# Patient Record
Sex: FEMALE | Race: WHITE | Employment: FULL TIME | URBAN - METROPOLITAN AREA
[De-identification: names, ages, dates, MRNs, and addresses within clinical notes are randomized per-mention and may not be internally consistent; named-entity substitution may affect disease eponyms.]

---

## 2021-09-13 ENCOUNTER — LAB REQUISITION (OUTPATIENT)
Dept: LAB | Facility: CLINIC | Age: 36
End: 2021-09-13
Payer: COMMERCIAL

## 2021-09-13 DIAGNOSIS — Z01.818 ENCOUNTER FOR OTHER PREPROCEDURAL EXAMINATION: ICD-10-CM

## 2021-09-13 DIAGNOSIS — Z13.220 ENCOUNTER FOR SCREENING FOR LIPOID DISORDERS: ICD-10-CM

## 2021-09-13 LAB
CHOLEST SERPL-MCNC: 161 MG/DL
FASTING STATUS PATIENT QL REPORTED: NORMAL
HDLC SERPL-MCNC: 70 MG/DL
LDLC SERPL CALC-MCNC: 75 MG/DL
TRIGL SERPL-MCNC: 80 MG/DL

## 2021-09-13 PROCEDURE — 80061 LIPID PANEL: CPT | Mod: ORL | Performed by: PHYSICIAN ASSISTANT

## 2021-09-13 PROCEDURE — U0003 INFECTIOUS AGENT DETECTION BY NUCLEIC ACID (DNA OR RNA); SEVERE ACUTE RESPIRATORY SYNDROME CORONAVIRUS 2 (SARS-COV-2) (CORONAVIRUS DISEASE [COVID-19]), AMPLIFIED PROBE TECHNIQUE, MAKING USE OF HIGH THROUGHPUT TECHNOLOGIES AS DESCRIBED BY CMS-2020-01-R: HCPCS | Mod: ORL | Performed by: PHYSICIAN ASSISTANT

## 2021-09-14 LAB — SARS-COV-2 RNA RESP QL NAA+PROBE: NEGATIVE

## 2023-10-30 ENCOUNTER — OFFICE VISIT (OUTPATIENT)
Dept: URGENT CARE | Facility: URGENT CARE | Age: 38
End: 2023-10-30
Payer: COMMERCIAL

## 2023-10-30 VITALS
HEART RATE: 71 BPM | TEMPERATURE: 97.8 F | RESPIRATION RATE: 15 BRPM | OXYGEN SATURATION: 98 % | DIASTOLIC BLOOD PRESSURE: 81 MMHG | SYSTOLIC BLOOD PRESSURE: 128 MMHG

## 2023-10-30 DIAGNOSIS — S61.552A DOG BITE OF LEFT WRIST, INITIAL ENCOUNTER: Primary | ICD-10-CM
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
## 2023-10-30 PROCEDURE — 99203 OFFICE O/P NEW LOW 30 MIN: CPT | Performed by: PHYSICIAN ASSISTANT

## 2023-10-30 RX ORDER — ESCITALOPRAM OXALATE 10 MG/1
10 TABLET ORAL DAILY
COMMUNITY

## 2023-10-30 NOTE — PATIENT INSTRUCTIONS
Patient was educated on the natural course of injury.  Keep wound dry and clean. Wash area with soap and water. Watch for signs of infection such as redness or purulent drainage. Conservative measures discussed including over-the-counter Tylenol as needed for pain. See your primary care provider in 7 days if there is no improvement or sooner as needed. Seek emergency care if you develop fever, streaking, severe pain or rapidly spreading redness.

## 2023-10-30 NOTE — PROGRESS NOTES
URGENT CARE VISIT:    SUBJECTIVE:   Asha Wise is a 38 year old female who presents with a chief complaint of an animal bite on the left wrist.  She was bitten by her dog today. Cicumstances of bite: animals fighting.  Severity: bite with skin break.  Animal's immunizations up to date  Associated symptoms include none. Tetanus status: last tetanus booster within 10 years    PMH: No past medical history on file.  Allergies: Patient has no known allergies.   Medications:   Current Outpatient Medications   Medication Sig Dispense Refill    amoxicillin-clavulanate (AUGMENTIN) 875-125 MG tablet Take 1 tablet by mouth 2 times daily for 7 days 14 tablet 0    escitalopram (LEXAPRO) 10 MG tablet Take 10 mg by mouth daily      levonorgestrel (MIRENA) 52 MG (20 mcg/day) IUD by Intrauterine route once       Social History:   Social History     Tobacco Use    Smoking status: Not on file    Smokeless tobacco: Not on file   Substance Use Topics    Alcohol use: Not on file       ROS:  Review of systems negative except as stated above.    OBJECTIVE:  /81   Pulse 71   Temp 97.8  F (36.6  C) (Temporal)   Resp 15   SpO2 98%   GENERAL: healthy, alert no acute distress  SKIN: puncture wound of left wrist  MS:extremities normal- no gross deformities noted,  FROM noted in all extremities  NEURO: Normal strength and tone, sensory exam grossly normal,  normal speech and mentation        ASSESSMENT:    ICD-10-CM    1. Dog bite of left wrist, initial encounter  S61.552A amoxicillin-clavulanate (AUGMENTIN) 875-125 MG tablet    W54.0XXA           PLAN:  Patient Instructions   Patient was educated on the natural course of injury.  Keep wound dry and clean. Wash area with soap and water. Watch for signs of infection such as redness or purulent drainage. Conservative measures discussed including over-the-counter Tylenol as needed for pain. See your primary care provider in 7 days if there is no improvement or sooner as needed.  Seek emergency care if you develop fever, streaking, severe pain or rapidly spreading redness.     Patient verbalized understanding and is agreeable to plan. The patient was discharged ambulatory and in stable condition.    Bee Joseph PA-C ....................  10/30/2023   1:32 PM

## 2024-11-09 ENCOUNTER — OFFICE VISIT (OUTPATIENT)
Dept: URGENT CARE | Facility: URGENT CARE | Age: 39
End: 2024-11-09
Payer: COMMERCIAL

## 2024-11-09 VITALS
SYSTOLIC BLOOD PRESSURE: 134 MMHG | WEIGHT: 271.5 LBS | TEMPERATURE: 98 F | OXYGEN SATURATION: 97 % | DIASTOLIC BLOOD PRESSURE: 84 MMHG | HEART RATE: 101 BPM | RESPIRATION RATE: 16 BRPM

## 2024-11-09 DIAGNOSIS — J01.00 ACUTE NON-RECURRENT MAXILLARY SINUSITIS: ICD-10-CM

## 2024-11-09 DIAGNOSIS — R05.1 ACUTE COUGH: Primary | ICD-10-CM

## 2024-11-09 PROCEDURE — 99213 OFFICE O/P EST LOW 20 MIN: CPT | Performed by: FAMILY MEDICINE

## 2024-11-09 RX ORDER — BENZONATATE 100 MG/1
100 CAPSULE ORAL 3 TIMES DAILY PRN
Qty: 21 CAPSULE | Refills: 1 | Status: SHIPPED | OUTPATIENT
Start: 2024-11-09

## 2024-11-09 RX ORDER — DOXYCYCLINE HYCLATE 100 MG
100 TABLET ORAL 2 TIMES DAILY
Qty: 14 TABLET | Refills: 0 | Status: SHIPPED | OUTPATIENT
Start: 2024-11-09 | End: 2024-11-16

## 2024-11-09 NOTE — PATIENT INSTRUCTIONS
For cough (can take all of them together):   - Dextromethorphan 'DM' (over the counter - can be found in Robitussin/Delsym/generic cough meds)  - Honey: lozenges/cough drops or mix honey in warm water  - Tessalon/Benzonatate (prescription) every 8 hours as needed        Doxycycline antibiotic twice a day for 1 week to cover for walking pneumonia and early sinus infection      Start nasal steroid fluticasone/mometasone or triamcinolone use once daily in each nostril    If no improvement in your symptoms in the next 3 days please call your doctors office or return to be evaluated, seek help sooner if things are worsening

## 2024-11-09 NOTE — PROGRESS NOTES
Assessment & Plan     Acute cough  - doxycycline hyclate (VIBRA-TABS) 100 MG tablet  Dispense: 14 tablet; Refill: 0  - benzonatate (TESSALON) 100 MG capsule  Dispense: 21 capsule; Refill: 1    Acute non-recurrent maxillary sinusitis     Given the duration of her illness and with increased community rates for mycoplasma pneumonia we came to shared decision to proceed with doxycycline therapy we discussed this in theory should cover for walking pneumonia in addition to an early sinus infection.  She is advised to use nasal fluticasone steroid spray to treat for early sinusitis.  Tessalon provided to help with her cough severity.  Return in 3 days if symptoms not better.    Jason Rodarte MD   [facility]    Cecilia Barry is a 39 year old female who presents to clinic today for the following health issues:  Chief Complaint   Patient presents with    Cough    Facial Pain    Ear Pressure     HPI    Patient reports symptoms been present for 3 weeks to facial pressure to starting last few days in the maxillary sinuses along with having ear pressure although no ear discharge.  She reports the cough initially improved before returning again over this last 3 weeks.  She has not been exposed to pertussis, influenza or COVID.  She has no shortness of breath but is starting to feel inflammation from frequent coughing.  For period of time she used over-the-counter cough syrup.    She has a son at home who has not been sick during this period      There are no active problems to display for this patient.      Current Outpatient Medications   Medication Sig Dispense Refill    benzonatate (TESSALON) 100 MG capsule Take 1 capsule (100 mg) by mouth 3 times daily as needed for cough. 21 capsule 1    doxycycline hyclate (VIBRA-TABS) 100 MG tablet Take 1 tablet (100 mg) by mouth 2 times daily for 7 days. 14 tablet 0    escitalopram (LEXAPRO) 10 MG tablet Take 10 mg by mouth daily      levonorgestrel (MIRENA) 52 MG  (20 mcg/day) IUD by Intrauterine route once       No current facility-administered medications for this visit.           Objective    /84   Pulse 101   Temp 98  F (36.7  C) (Temporal)   Resp 16   Wt 123.2 kg (271 lb 8 oz)   SpO2 97%   Physical Exam       Lung exam unremarkable bilaterally  Ears without any signs of erythema or redness  Heart rate regular rhythm no murmurs rubs or gallops  GEn: NAD, normal mentation  No results found for any visits on 11/09/24.                  The use of Dragon/[employer] dictation services may have been used to construct the content in this note; any grammatical or spelling errors are non-intentional. Please contact the author of this note directly if you are in need of any clarification.